# Patient Record
(demographics unavailable — no encounter records)

---

## 2025-01-21 NOTE — PHYSICAL EXAM
[Normal] : alert, normal voice/communication, healthy appearing, no acute distress [Sclera] : the sclera and conjunctiva were normal [Hearing Threshold Finger Rub Not [place]] : hearing was normal [Normal Appearance] : the appearance of the neck was normal [No Respiratory Distress] : no respiratory distress [Abdomen Tenderness] : non-tender [No Masses] : no abdominal mass palpated [Abdomen Soft] : soft [Abnormal Walk] : normal gait [Oriented To Time, Place, And Person] : oriented to person, place, and time

## 2025-01-21 NOTE — PHYSICAL EXAM
[Normal] : alert, normal voice/communication, healthy appearing, no acute distress [Sclera] : the sclera and conjunctiva were normal [Hearing Threshold Finger Rub Not [place]] : hearing was normal [Normal Appearance] : the appearance of the neck was normal [No Respiratory Distress] : no respiratory distress [Abdomen Tenderness] : non-tender [No Masses] : no abdominal mass palpated [Abdomen Soft] : soft [Abnormal Walk] : normal gait [Oriented To Time, Place, And Person] : oriented to person, place, and time

## 2025-01-27 NOTE — HISTORY OF PRESENT ILLNESS
[FreeTextEntry1] : 59 YO M with PMH of CKD, BPH, asthma, joint pain presents for GERD.   Having burning acid reflux typically in the evenings or at night. Denies daytime symptoms. Eats dinner around 8pm, typically goes to be around 1AM. Notices in the afternoon his voice is more hoarse. Pulmonologist told him he might have reflux. Has not tried any antacid medications. Reports abdominal discomfort when he is unable to urinate immediately. Reports occasional nausea. Denies dysphagia, unintentional weight loss, rectal bleeding, vomiting, early satiety. Having 1-2 BMs daily.   Works for the city as a    PMH: CKD, BPH, asthma, joint pain PSH: cyst removal  Family history: denies GI malignancy  Allergies: NKDA Medications: see chart Supplements/OTC: denies  AC or NSAIDs: denies    Colon Cancer Screening: no prior screening EGD: denies  Labs: 4 months ago Imaging: denies    Tobacco: denies  Alcohol: denies  Drugs: denies

## 2025-01-27 NOTE — ASSESSMENT
[FreeTextEntry1] : 57 YO M with PMH of CKD, BPH, asthma, HTN, joint pain presents for GERD.  #GERD, typically nocturnal  - order h pylori breath test today - Advise patient to avoid spicy foods, greasy foods, tomato-based foods, citrus, caffeine, chocolate. Recommend sitting upright for at least 2-3 hours after eating.  - trial of Omeprazole 40mg QD to be taken 30-60 minutes prior to dinner for 2 months (advise evening dosing as symptoms are mostly nocturnal) - if no improvement on PPI will plan for egd  #Colon Cancer Screening - no prior screening - plan to schedule patient for colonoscopy at [facility] with Sutab prep after f/u visit - Discussed R/A/I/B with patient. Education provided on preparation instructions and the need for an escort.  Follow up TEB in 2 months   I, Dr. Reynoso, personally performed the evaluation and management (E/M) services for this new patient. That E/M includes conducting the initial examination, assessing all conditions, and establishing the plan of care. Today, my ACP, Abril Loja, was here to observe my evaluation and management services for this patient to be followed going forward.

## 2025-01-27 NOTE — ASSESSMENT
[FreeTextEntry1] : 57 YO M with PMH of CKD, BPH, asthma, HTN, joint pain presents for GERD.  #GERD, typically nocturnal  - order h pylori breath test today - Advise patient to avoid spicy foods, greasy foods, tomato-based foods, citrus, caffeine, chocolate. Recommend sitting upright for at least 2-3 hours after eating.  - trial of Omeprazole 40mg QD to be taken 30-60 minutes prior to dinner for 2 months (advise evening dosing as symptoms are mostly nocturnal) - if no improvement on PPI will plan for egd  #Colon Cancer Screening - no prior screening - plan to schedule patient for colonoscopy at [facility] with Sutab prep after f/u visit - Discussed R/A/I/B with patient. Education provided on preparation instructions and the need for an escort.  Follow up TEB in 2 months   I, Dr. Reynoso, personally performed the evaluation and management (E/M) services for this new patient. That E/M includes conducting the initial examination, assessing all conditions, and establishing the plan of care. Today, my ACP, Abril Loja, was here to observe my evaluation and management services for this patient to be followed going forward.

## 2025-01-27 NOTE — HISTORY OF PRESENT ILLNESS
[FreeTextEntry1] : 57 YO M with PMH of CKD, BPH, asthma, joint pain presents for GERD.   Having burning acid reflux typically in the evenings or at night. Denies daytime symptoms. Eats dinner around 8pm, typically goes to be around 1AM. Notices in the afternoon his voice is more hoarse. Pulmonologist told him he might have reflux. Has not tried any antacid medications. Reports abdominal discomfort when he is unable to urinate immediately. Reports occasional nausea. Denies dysphagia, unintentional weight loss, rectal bleeding, vomiting, early satiety. Having 1-2 BMs daily.   Works for the city as a    PMH: CKD, BPH, asthma, joint pain PSH: cyst removal  Family history: denies GI malignancy  Allergies: NKDA Medications: see chart Supplements/OTC: denies  AC or NSAIDs: denies    Colon Cancer Screening: no prior screening EGD: denies  Labs: 4 months ago Imaging: denies    Tobacco: denies  Alcohol: denies  Drugs: denies

## 2025-01-31 NOTE — HISTORY OF PRESENT ILLNESS
[FreeTextEntry1] : 57 yo M here for further evaluation and management of CKD 3, HTN, glucose intolerance, BPH and chronic low back pain Sees his MDs for LBP on regualr basis as sometime pains severe to  the point that he has FMLA as needed- Has used naproxen and aleve- no NSAIDs for past 5 months TIA 2019 Denies flank pain, dysuria, hematuria or frothy urine  No CP, SOB, N, V,D

## 2025-01-31 NOTE — HISTORY OF PRESENT ILLNESS
[FreeTextEntry1] : 59 yo M here for further evaluation and management of CKD 3, HTN, glucose intolerance, BPH and chronic low back pain Sees his MDs for LBP on regualr basis as sometime pains severe to  the point that he has FMLA as needed- Has used naproxen and aleve- no NSAIDs for past 5 months TIA 2019 Denies flank pain, dysuria, hematuria or frothy urine  No CP, SOB, N, V,D

## 2025-01-31 NOTE — ASSESSMENT
[FreeTextEntry1] : all lab data was reviewed with patient in detail from EHR., latest 1/29/2025 prior data from pt's portal on phone: Oct? creat 1.50; K 5.2; a1c 6.4% 59 yo M with CKD 3, HTN, DM, hypokalemia -CKD 3- creat acceptable 1.62 eGFR 49 UAC 30; no hematuria advised to avoid NSAIDs -HTN- BP controlled c/w present regimen -hypokalemia- K 3.4- prior K was 5.2- ad herb K diet -DMT2- a1c 6.3% maintain reduced intake of CHOs  f/u 4 months to assure stability   (for late reported labs, LM on pt's VM- okay)

## 2025-01-31 NOTE — ASSESSMENT
[FreeTextEntry1] : all lab data was reviewed with patient in detail from EHR., latest 1/29/2025 prior data from pt's portal on phone: Oct? creat 1.50; K 5.2; a1c 6.4% 57 yo M with CKD 3, HTN, DM, hypokalemia -CKD 3- creat acceptable 1.62 eGFR 49 UAC 30; no hematuria advised to avoid NSAIDs -HTN- BP controlled c/w present regimen -hypokalemia- K 3.4- prior K was 5.2- ad herb K diet -DMT2- a1c 6.3% maintain reduced intake of CHOs  f/u 4 months to assure stability   (for late reported labs, LM on pt's VM- okay)

## 2025-03-05 NOTE — END OF VISIT
[FreeTextEntry3] : I, Dr. Dunham, personally performed the evaluation and management (E/M) services for this established patient who presents today with (a) new problem(s)/exacerbation of (an) existing condition(s). That E/M includes conducting the clinically appropriate interval history &/or exam, assessing all new/exacerbated conditions, and establishing a new plan of care. Today, my ALDEN, Shashi Tovar, was here to observe my evaluation and management service for this new problem/exacerbated condition and follow the plan of care established by me going forward

## 2025-03-05 NOTE — HISTORY OF PRESENT ILLNESS
[FreeTextEntry1] : 58M here for follow up for BPH with elevated PSA and ED   - on alfuzosin 10 mg  - reports that he notices improvement immediately following taking the medication but does not last through the night  - daytime reports hesitancy, frequency - also has had some rare incontinence and post void dribbling  - reports this is having an impact on his work and personal life - double voiding in the AM  - has used sildenafil 100 mg, has noticed mild LOS but is able to maintain sufficient rigidity   MRI 12/2024 PIRADS 2 53 g gland  PSA trend:  4.66 10/2023   2.55 6/2022

## 2025-03-05 NOTE — ASSESSMENT
[FreeTextEntry1] : 58M here for follow up for BPH with elevated PSA and ED   BPH  - UA UCx PSA - PVR to r/o retention: 1 cc - alfuzosin 10 mg  - finasteride 5 mg vs cialis 5 mg   ED - continue sildenafil 100 mg  - TT LH E2

## 2025-03-05 NOTE — PHYSICAL EXAM
[Normal Appearance] : normal appearance [General Appearance - In No Acute Distress] : no acute distress [Edema] : no peripheral edema [] : no respiratory distress [Exaggerated Use Of Accessory Muscles For Inspiration] : no accessory muscle use [Abdomen Tenderness] : non-tender [Penis Abnormality] : normal uncircumcised penis [Urinary Bladder Findings] : the bladder was normal on palpation [Testes Tenderness] : no tenderness of the testes [Scrotum] : the scrotum was normal [Testes Mass (___cm)] : there were no testicular masses [Normal Station and Gait] : the gait and station were normal for the patient's age [Oriented To Time, Place, And Person] : oriented to person, place, and time [Affect] : the affect was normal [Chaperone Declined] : Patient declined chaperone [de-identified] : 60 g prostate, induration at R posterior lobe, consistent with MRI findings.  [FreeTextEntry3] : A chaperone was offered to accompany the patient during the physical examination, patient was informed chaperone would be readily available to oversee the exam. Patient refused a chaperone at this time and proceeded to exam.

## 2025-03-13 NOTE — HISTORY OF PRESENT ILLNESS
[Home] : at home, [unfilled] , at the time of the visit. [Medical Office: ([facility])___] : at the medical office located in  [Telehealth (audio & video)] : This visit was provided via telehealth using real-time 2-way audio visual technology. [Verbal consent obtained from patient] : the patient, [unfilled] [FreeTextEntry1] : 59 YO M with PMH of CKD, BPH, asthma, joint pain presents for follow up GERD.  H pylori negative. Taking Omeprazole 40mg daily which has improved his reflux symptoms. Denies any breakthrough symptoms. Denies dysphagia.  Reports some abdominal discomfort, having urinary retention which is causing symptoms.  Has upcoming appt with Tenisha.    HPI: Having burning acid reflux typically in the evenings or at night. Denies daytime symptoms. Eats dinner around 8pm, typically goes to be around 1AM. Notices in the afternoon his voice is more hoarse. Pulmonologist told him he might have reflux. Has not tried any antacid medications. Reports abdominal discomfort when he is unable to urinate immediately. Reports occasional nausea. Denies dysphagia, unintentional weight loss, rectal bleeding, vomiting, early satiety. Having 1-2 BMs daily.   Works for the city as a    PMH: CKD, BPH, asthma, joint pain PSH: cyst removal  Family history: denies GI malignancy  Allergies: NKDA Medications: see chart Supplements/OTC: denies  AC or NSAIDs: denies    Colon Cancer Screening: no prior screening EGD: denies  Labs: 4 months ago Imaging: denies    Tobacco: denies  Alcohol: denies  Drugs: denies

## 2025-07-10 NOTE — REVIEW OF SYSTEMS
[As noted in HPI] : as noted in HPI [As Noted in HPI] : as noted in HPI [Negative] : Heme/Lymph not examined

## 2025-07-13 NOTE — HISTORY OF PRESENT ILLNESS
[FreeTextEntry1] : 59 yo M with CKD 3, HTN, glucose intolerance, BPH and chronic low back pain, for follow up evaluation. managing LBP- regular MD visits- no plans for surgery though has had an evaluation used all his [place] time-  able to work however when pain too severe, does need to take day off without pay- coping and navigating has not been using NSAIDs Denies flank pain, dysuria, hematuria or frothy urine  No CP, SOB, N, V, D   TIA 2019

## 2025-07-13 NOTE — PHYSICAL EXAM
[General Appearance - Alert] : alert [General Appearance - In No Acute Distress] : in no acute distress [] : no respiratory distress [Auscultation Breath Sounds / Voice Sounds] : lungs were clear to auscultation bilaterally [Heart Rate And Rhythm] : heart rate was normal and rhythm regular [Heart Sounds] : normal S1 and S2 [No CVA Tenderness] : no ~M costovertebral angle tenderness [Oriented To Time, Place, And Person] : oriented to person, place, and time [Impaired Insight] : insight and judgment were intact [Affect] : the affect was normal [FreeTextEntry1] : 2+ LE edema

## 2025-07-13 NOTE — ASSESSMENT
[FreeTextEntry1] : all lab data was reviewed with patient in detail from EHR., latest 7/10/2025 59 yo M with CKD 3, HTN, DM, hypokalemia- new LE edema u/a  + prot, no blood; PCR .3., . -LE edema- degree of proteinuria unlikely to yield significant edema also to send for venous dopplers add diuretic once doppler completed -CKD 3- creat 1.64 stable range no hematuria able to avoid NSAIDs -HTN- BP at goal- c/w present regimen -hypokalemia- resolved- k 4.3  -DMT2- a1c 6.4% -good-   repeat u/a with PCR and serologies  TTM to review then f/u 4 months to assure stability

## 2025-07-13 NOTE — HISTORY OF PRESENT ILLNESS
[FreeTextEntry1] : 57 yo M with CKD 3, HTN, glucose intolerance, BPH and chronic low back pain, for follow up evaluation. managing LBP- regular MD visits- no plans for surgery though has had an evaluation used all his [place] time-  able to work however when pain too severe, does need to take day off without pay- coping and navigating has not been using NSAIDs Denies flank pain, dysuria, hematuria or frothy urine  No CP, SOB, N, V, D   TIA 2019